# Patient Record
Sex: MALE | Race: WHITE
[De-identification: names, ages, dates, MRNs, and addresses within clinical notes are randomized per-mention and may not be internally consistent; named-entity substitution may affect disease eponyms.]

---

## 2017-07-01 ENCOUNTER — HEALTH MAINTENANCE LETTER (OUTPATIENT)
Age: 57
End: 2017-07-01

## 2019-06-24 ENCOUNTER — RECORDS - HEALTHEAST (OUTPATIENT)
Dept: ADMINISTRATIVE | Facility: OTHER | Age: 59
End: 2019-06-24

## 2019-07-02 ENCOUNTER — HOSPITAL ENCOUNTER (OUTPATIENT)
Dept: RESPIRATORY THERAPY | Facility: HOSPITAL | Age: 59
Discharge: HOME | End: 2019-07-02

## 2019-07-02 DIAGNOSIS — J84.10 PULMONARY FIBROSIS (H): ICD-10-CM

## 2019-07-02 LAB — HGB BLD-MCNC: 11.9 G/DL (ref 14–18)

## 2019-07-09 ENCOUNTER — RECORDS - HEALTHEAST (OUTPATIENT)
Dept: ADMINISTRATIVE | Facility: OTHER | Age: 59
End: 2019-07-09

## 2019-07-11 ENCOUNTER — HOSPITAL ENCOUNTER (OUTPATIENT)
Dept: RADIOLOGY | Facility: HOSPITAL | Age: 59
Discharge: HOME | End: 2019-07-11

## 2019-07-11 DIAGNOSIS — J84.10 PULMONARY FIBROSIS (H): ICD-10-CM

## 2020-08-04 ENCOUNTER — TRANSFERRED RECORDS (OUTPATIENT)
Dept: HEALTH INFORMATION MANAGEMENT | Facility: CLINIC | Age: 60
End: 2020-08-04

## 2020-08-05 ENCOUNTER — TRANSFERRED RECORDS (OUTPATIENT)
Dept: HEALTH INFORMATION MANAGEMENT | Facility: CLINIC | Age: 60
End: 2020-08-05

## 2020-08-27 ENCOUNTER — RECORDS - HEALTHEAST (OUTPATIENT)
Dept: ADMINISTRATIVE | Facility: OTHER | Age: 60
End: 2020-08-27

## 2020-08-28 ENCOUNTER — RECORDS - HEALTHEAST (OUTPATIENT)
Dept: ADMINISTRATIVE | Facility: OTHER | Age: 60
End: 2020-08-28

## 2020-09-02 ENCOUNTER — TRANSFERRED RECORDS (OUTPATIENT)
Dept: HEALTH INFORMATION MANAGEMENT | Facility: CLINIC | Age: 60
End: 2020-09-02

## 2020-09-08 ENCOUNTER — TRANSCRIBE ORDERS (OUTPATIENT)
Dept: OTHER | Age: 60
End: 2020-09-08

## 2020-09-08 DIAGNOSIS — C48.0 MALIGNANT NEOPLASM OF RETROPERITONEUM (H): Primary | ICD-10-CM

## 2020-09-14 ENCOUNTER — PATIENT OUTREACH (OUTPATIENT)
Dept: ONCOLOGY | Facility: CLINIC | Age: 60
End: 2020-09-14

## 2020-09-14 NOTE — PROGRESS NOTES
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 Advised him that we need the [facility] auth# to proceed with scheduling him. Our Records [facility] is [facility] to [facility] for [facility].

## 2020-09-18 NOTE — TELEPHONE ENCOUNTER
ONCOLOGY INTAKE: Records Information      APPT INFORMATION:  Referring provider:  Dr. Kodak Salinas MD  Referring provider s clinic:  [facility]  Reason for visit/diagnosis:  Malignant neoplasm of retroperitoneum  Has patient been notified of appointment date and time?: Yes    RECORDS INFORMATION:  Were the records received with the referral (via [employer])? Yes    Has patient been seen for any external appt for this diagnosis? Yes    If yes, where?  [facility]    Has patient had any imaging or procedures outside of [place] for this condition? Yes      If Yes, where?  [facility]    ADDITIONAL INFORMATION:  Patient coming in person.

## 2020-09-21 NOTE — TELEPHONE ENCOUNTER
Action    Action Taken 9/21/20: [facility] previously [facility], updated PET requested.    -[employer] [facility], [facility] - Path: [id number]    [facility] [id number]  12:11 PM    -9/25/20: Slides rec'd from [facility]  4:09 PM     RECORDS STATUS - ALL OTHER DIAGNOSIS      [facility]CEIV[facility] FROM: [facility]   DATE RECEIVED:    NOTES STATUS DETAILS   OFFICE NOTE from referring provider [facility] 9/2/20: [facility] Kod[facility] Salinas   OFFICE NOTE from medical oncologist     DISCHARGE SUMMARY from hospital     DISCHARGE REPORT from the ER     OPERATIVE REPORT     MEDICATION LIST     CLINICAL TRIAL TREATMENTS TO DATE     LABS     PATHOLOGY REPORTS [facility], Reports in Epic, Slides requested 9/21 8/5/20: VO-OE-  8/5/20: SG-ON-   ANYTHING RELATED TO DIAGNOSIS     GENONOMIC TESTING     TYPE:     IMAGING (NEED IMAGES & REPORT)     CT SCANS     MRI     MAMMO     ULTRASOUND     PET

## 2020-09-23 ENCOUNTER — HOSPITAL ENCOUNTER (OUTPATIENT)
Dept: RADIOLOGY | Facility: CLINIC | Age: 60
Discharge: HOME | End: 2020-09-23

## 2020-09-23 ENCOUNTER — HOSPITAL ENCOUNTER (OUTPATIENT)
Dept: RESPIRATORY THERAPY | Facility: CLINIC | Age: 60
Discharge: HOME | End: 2020-09-23

## 2020-09-23 DIAGNOSIS — E84.9 CYSTIC FIBROSIS (H): ICD-10-CM

## 2020-09-29 ENCOUNTER — PRE VISIT (OUTPATIENT)
Dept: ONCOLOGY | Facility: CLINIC | Age: 60
End: 2020-09-29

## 2020-10-02 PROCEDURE — 999N001032 HC STATISTIC REVIEW OUTSIDE SLIDES TC 88321: Performed by: INTERNAL MEDICINE

## 2020-10-02 PROCEDURE — 88323 CONSLTJ&REPRT MATRL PREP SLD: CPT | Mod: TC | Performed by: INTERNAL MEDICINE

## 2020-10-02 PROCEDURE — 88321 CONSLTJ&REPRT SLD PREP ELSWR: CPT | Mod: 26 | Performed by: PATHOLOGY

## 2020-10-07 ENCOUNTER — ONCOLOGY VISIT (OUTPATIENT)
Dept: ONCOLOGY | Facility: CLINIC | Age: 60
End: 2020-10-07
Attending: INTERNAL MEDICINE
Payer: COMMERCIAL

## 2020-10-07 VITALS
RESPIRATION RATE: 16 BRPM | HEART RATE: 89 BPM | HEIGHT: 70 IN | SYSTOLIC BLOOD PRESSURE: 114 MMHG | TEMPERATURE: 97.9 F | WEIGHT: 222.3 LBS | BODY MASS INDEX: 31.82 KG/M2 | DIASTOLIC BLOOD PRESSURE: 73 MMHG | OXYGEN SATURATION: 96 %

## 2020-10-07 DIAGNOSIS — G47.33 OSA (OBSTRUCTIVE SLEEP APNEA): ICD-10-CM

## 2020-10-07 DIAGNOSIS — R18.8 OTHER ASCITES: ICD-10-CM

## 2020-10-07 DIAGNOSIS — R73.09 ELEVATED GLUCOSE: ICD-10-CM

## 2020-10-07 DIAGNOSIS — C48.0 MALIGNANT NEOPLASM OF RETROPERITONEUM (H): ICD-10-CM

## 2020-10-07 DIAGNOSIS — J44.9 CHRONIC OBSTRUCTIVE PULMONARY DISEASE, UNSPECIFIED COPD TYPE (H): ICD-10-CM

## 2020-10-07 DIAGNOSIS — F32.A DEPRESSION, UNSPECIFIED DEPRESSION TYPE: ICD-10-CM

## 2020-10-07 DIAGNOSIS — I10 ESSENTIAL HYPERTENSION: ICD-10-CM

## 2020-10-07 DIAGNOSIS — I50.9 CONGESTIVE HEART FAILURE, UNSPECIFIED HF CHRONICITY, UNSPECIFIED HEART FAILURE TYPE (H): ICD-10-CM

## 2020-10-07 DIAGNOSIS — M10.9 GOUT, UNSPECIFIED CAUSE, UNSPECIFIED CHRONICITY, UNSPECIFIED SITE: ICD-10-CM

## 2020-10-07 DIAGNOSIS — C49.9 SARCOMA OF SOFT TISSUE (H): Primary | ICD-10-CM

## 2020-10-07 PROCEDURE — 99205 OFFICE O/P NEW HI 60 MIN: CPT | Performed by: INTERNAL MEDICINE

## 2020-10-07 PROCEDURE — G0463 HOSPITAL OUTPT CLINIC VISIT: HCPCS

## 2020-10-07 RX ORDER — TRAZODONE HYDROCHLORIDE 50 MG/1
TABLET, FILM COATED ORAL
COMMUNITY
Start: 2020-06-24

## 2020-10-07 RX ORDER — METOPROLOL TARTRATE 50 MG
TABLET ORAL
COMMUNITY
Start: 2020-02-28

## 2020-10-07 RX ORDER — BUMETANIDE 2 MG/1
TABLET ORAL
COMMUNITY
Start: 2020-08-31

## 2020-10-07 RX ORDER — PREDNISONE 20 MG/1
TABLET ORAL
COMMUNITY
Start: 2019-10-16

## 2020-10-07 RX ORDER — ALLOPURINOL 300 MG/1
TABLET ORAL
COMMUNITY
Start: 2020-08-19

## 2020-10-07 RX ORDER — BRIMONIDINE TARTRATE 2 MG/ML
SOLUTION/ DROPS OPHTHALMIC
COMMUNITY
Start: 2020-07-30

## 2020-10-07 RX ORDER — ACETAMINOPHEN 500 MG
TABLET ORAL
COMMUNITY
Start: 2020-07-24

## 2020-10-07 RX ORDER — GLIPIZIDE 5 MG/1
TABLET ORAL
COMMUNITY
Start: 2020-09-10

## 2020-10-07 ASSESSMENT — MIFFLIN-ST. JEOR: SCORE: 1819.6

## 2020-10-07 ASSESSMENT — PAIN SCALES - GENERAL: PAINLEVEL: NO PAIN (0)

## 2020-10-07 NOTE — LETTER
10/7/2020         RE: Kodak Willett  [address]  [facility] [address]        Dear Colleague,    Thank you for referring your patient, Kodak Willett, to the [facility]. Please see a copy of my visit note below.        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  In brief he has a very complex medical history but about a year ago developed severe heart failure with ascites.  Since then he is lost about 75 pounds with marked improvement in edema and blistering of his legs and general feeling of wellbeing.  More recently during evaluation of the ascites and of the liver, a mass was noted near the right kidney.  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  A FISH test at [place] for MDM 2 amplification was reported as negative.  A FISH test for DDIT3 was also reported as negative for rearrangement.  Pathology review here is still pending.  He has quite a number of other problems and is on a number of drugs..  These include the heart failure the etiology of which is unclear, some ascites, emphysema along with a smoking history stopping in 2007.  Right now he does not get out of the house every day he can only walk about 100 yards if the gout is not bothering him.  He has diabetes which was doing okay off insulin but more recently he has been having high sugars.  He was started on glipizide 3 weeks ago but today had a level of 250; he is in the process of contacting his PCP today on that.  He was also found to have gout in May which he thinks he has had before but it was diagnosed in May.  He is on allopurinol and for the last few weeks is actually been doing pretty well.  This is been sufficiently severe that limited his activity on multiple occasions.  He does have some GERD for which he uses prn Tums but this is not too bad.  He does  "not sleep very well and does take trazodone.  He does have JEREMY and has a CPAP machine but for the last year or so has not been using it much because it has not been comfortable.  We did discuss the importance of using his CPAP machine if at all possible given his multiple other comorbidities that might be improved with that.  He was hospitalized both in May of this year and July and in July had acute renal failure requiring temporary dialysis.  He thinks this was most likely due to contrast he received for a CT scan.  In addition he feels he has been more depressed and anxious lately; since he is \"a loner\" the covid isolation has not bothered him that much but he would like more human interaction. 14-point ROS o/w neg    Past medical history includes the above but also a couple of episodes of spontaneous pneumothorax in the 1980s, history of colonic polyps and polypectomy, smoking history, history of obesity but he is lost a lot of weight recently, hypertension, COPD, diabetes, chronic anemia of CKD.    Allergies are noted in the records   Family history is not really contributory.    He is retired from working at the [employer] after his [employer].  He lives alone and notes he has a history of depression and anxiety and his mood has not been so great lately.  He describes himself as a loner and does not use alcohol or tobacco or any other drugs.      On exam he appeared comfortable with normal affect.  /73   Pulse 89   Temp 97.9  F (36.6  C)   Resp 16   Ht 1.778 m (5' 10\")   Wt 100.8 kg (222 lb 4.8 oz)   SpO2 96%   BMI 31.90 kg/m    He does have chronic venous stasis of the lower legs and probably at least 1+ edema, but by his description at this doing quite well.  He had fairly normal ambulation but he notes it is difficult to walk up 1 floor without getting short of breath.  He had full EOMs and full range of motion of the upper extremities.  Mentation was normal as was speech, and the mood seemed " okay.  There is no obvious neck mass or goiter.    The creatinine on August 6 was 1.3 and a hemoglobin was 10.5.  He has a history of chronic anemia of CKD.      I reviewed his imaging at length and I do not see any lung metastases.  His perirenal mass is of similar size on the 916 imaging is on the July 20 imaging.    Pathology reviewed here is pending.    We discussed the nature of cancer in general and his tumor in particular.  While we do not have a specific diagnosis it does seem to be a low-grade tumor and markers for myxoid liposarcoma and WDLS were negative I told him that given his multiple other comorbidities I think the idea of surgical removal would be too risky for the potential benefit.  The tumor has not changed in 2 months based on the July to September imaging.  I suggested interval CT scans of the chest abdomen pelvis every 3 months (next in Dec) and if the tumor actually growing notably one could revisit the potential risk benefit ratio given his other problems.  All of his questions are addressed he will call if others arise.  t>60 min, >50% face to face C&C    Jaswinder Jane M.D.  Professor  Hematology, Oncology and Transplantation

## 2020-10-07 NOTE — PROGRESS NOTES
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  In brief he has a very complex medical history but about a year ago developed severe heart failure with ascites.  Since then he is lost about 75 pounds with marked improvement in edema and blistering of his legs and general feeling of wellbeing.  More recently during evaluation of the ascites and of the liver, a mass was noted near the right kidney.  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  A FISH test at [place] for MDM 2 amplification was reported as negative.  A FISH test for DDIT3 was also reported as negative for rearrangement.  Pathology review here is still pending.  He has quite a number of other problems and is on a number of drugs..  These include the heart failure the etiology of which is unclear, some ascites, emphysema along with a smoking history stopping in 2007.  Right now he does not get out of the house every day he can only walk about 100 yards if the gout is not bothering him.  He has diabetes which was doing okay off insulin but more recently he has been having high sugars.  He was started on glipizide 3 weeks ago but today had a level of 250; he is in the process of contacting his PCP today on that.  He was also found to have gout in May which he thinks he has had before but it was diagnosed in May.  He is on allopurinol and for the last few weeks is actually been doing pretty well.  This is been sufficiently severe that limited his activity on multiple occasions.  He does have some GERD for which he uses prn Tums but this is not too bad.  He does not sleep very well and does take trazodone.  He does have JEREMY and has a CPAP machine but for the last year or so has not been using it much because it has not been comfortable.  We did discuss the importance of using his CPAP machine if at all possible given his  "multiple other comorbidities that might be improved with that.  He was hospitalized both in May of this year and July and in July had acute renal failure requiring temporary dialysis.  He thinks this was most likely due to contrast he received for a CT scan.  In addition he feels he has been more depressed and anxious lately; since he is \"a loner\" the covid isolation has not bothered him that much but he would like more human interaction. 14-point ROS o/w neg    Past medical history includes the above but also a couple of episodes of spontaneous pneumothorax in the 1980s, history of colonic polyps and polypectomy, smoking history, history of obesity but he is lost a lot of weight recently, hypertension, COPD, diabetes, chronic anemia of CKD.    Allergies are noted in the records   Family history is not really contributory.    He is retired from working at the [employer] after his [employer].  He lives alone and notes he has a history of depression and anxiety and his mood has not been so great lately.  He describes himself as a loner and does not use alcohol or tobacco or any other drugs.      On exam he appeared comfortable with normal affect.  /73   Pulse 89   Temp 97.9  F (36.6  C)   Resp 16   Ht 1.778 m (5' 10\")   Wt 100.8 kg (222 lb 4.8 oz)   SpO2 96%   BMI 31.90 kg/m    He does have chronic venous stasis of the lower legs and probably at least 1+ edema, but by his description at this doing quite well.  He had fairly normal ambulation but he notes it is difficult to walk up 1 floor without getting short of breath.  He had full EOMs and full range of motion of the upper extremities.  Mentation was normal as was speech, and the mood seemed okay.  There is no obvious neck mass or goiter.    The creatinine on August 6 was 1.3 and a hemoglobin was 10.5.  He has a history of chronic anemia of CKD.      I reviewed his imaging at length and I do not see any lung metastases.  His perirenal mass is of similar " size on the 916 imaging is on the July 20 imaging.    Pathology reviewed here is pending.    We discussed the nature of cancer in general and his tumor in particular.  While we do not have a specific diagnosis it does seem to be a low-grade tumor and markers for myxoid liposarcoma and WDLS were negative I told him that given his multiple other comorbidities I think the idea of surgical removal would be too risky for the potential benefit.  The tumor has not changed in 2 months based on the July to September imaging.  I suggested interval CT scans of the chest abdomen pelvis every 3 months (next in Dec) and if the tumor actually growing notably one could revisit the potential risk benefit ratio given his other problems.  All of his questions are addressed he will call if others arise.  t>60 min, >50% face to face C&C    Jaswinder Jane M.D.  Professor  Hematology, Oncology and Transplantation

## 2020-10-07 NOTE — NURSING NOTE
"Oncology Rooming Note    October 7, 2020 11:16 AM   Kodak Willett is a 59 year old male who presents for:    Chief Complaint   Patient presents with     New Patient     Benign neoplasm of colon     Initial Vitals: /73   Pulse 89   Temp 97.9  F (36.6  C)   Resp 16   Ht 1.778 m (5' 10\")   Wt 100.8 kg (222 lb 4.8 oz)   SpO2 96%   BMI 31.90 kg/m   Estimated body mass index is 31.9 kg/m  as calculated from the following:    Height as of this encounter: 1.778 m (5' 10\").    Weight as of this encounter: 100.8 kg (222 lb 4.8 oz). Body surface area is 2.23 meters squared.  No Pain (0) Comment: Data Unavailable   No LMP for male patient.  Allergies reviewed: Yes  Medications reviewed: Yes    Medications: Medication refills not needed today.  Pharmacy name entered into [facility]: [facility] - [address]    Clinical concerns: New patient today. Dr. Jane was notified.      Angelina Riley MA            "

## 2020-10-08 LAB — COPATH REPORT: NORMAL

## 2021-05-30 NOTE — PROGRESS NOTES
RESPIRATORY CARE NOTE     Patient Name: Kodak Willett  Today's Date: 7/2/2019     Complete PFT done. Pt performed tests with good effort. Test results meet ATS standards for repeatability and acceptability.  HGB drawn and was 11.9 (L).  Results scanned into epic.  Results faxed to [facility].  Results Faxed to [facility].  Pt left in no distress.       Aleisha Abreu, DARRELLT

## 2021-06-11 NOTE — PROGRESS NOTES
RESPIRATORY CARE NOTE    Pre and post bronchodilator spirometry completed by patient.  Good patient effort and cooperation. Albuterol 2.5 mg neb given for bronchodilation.  PT had coughed frequently and had premature airway closure in the Pre FVC, otherwise the results of this test meet the ATS standards for acceptability and repeatability. PT returned to baseline and left in no distress.    Zander Byrd, LRT  9/23/2020